# Patient Record
Sex: FEMALE | Race: WHITE
[De-identification: names, ages, dates, MRNs, and addresses within clinical notes are randomized per-mention and may not be internally consistent; named-entity substitution may affect disease eponyms.]

---

## 2019-03-25 ENCOUNTER — HOSPITAL ENCOUNTER (EMERGENCY)
Dept: HOSPITAL 25 - UCCORT | Age: 44
Discharge: HOME | End: 2019-03-25
Payer: COMMERCIAL

## 2019-03-25 VITALS — DIASTOLIC BLOOD PRESSURE: 66 MMHG | SYSTOLIC BLOOD PRESSURE: 103 MMHG

## 2019-03-25 DIAGNOSIS — F17.210: ICD-10-CM

## 2019-03-25 DIAGNOSIS — M54.40: Primary | ICD-10-CM

## 2019-03-25 PROCEDURE — G0463 HOSPITAL OUTPT CLINIC VISIT: HCPCS

## 2019-03-25 PROCEDURE — 99211 OFF/OP EST MAY X REQ PHY/QHP: CPT

## 2019-03-25 NOTE — ED
Back Pain





- HPI Summary


HPI Summary: 





43 yr old female with the complaint of low back pain.  Onset of pain on 3/21.  

She was doing a lot of cleaning and bending over.  She felt her muscles felt 

pulled, but then the follow ing morning she felt the back muscles feel tighter 

and she noticed it was harder to stand up straight.  She started herself on 

prednisone since she has a history of Lupus and gets flare ups.   She has had 

pain in the same area in the past with sciatica.   The patient rated her pain 

as 10/10 on friday.  Today it is 6/10 and she is feeling better.  She has no 

bowel or bladder incontinence.  She is already on Vicodin for chronic pain from 

her PMD.





No fever, chills, falls or trauma.  No other complaints.  





- History of Current Complaint


Chief Complaint: UCBackPain


Stated Complaint: SEVERE BACK PAIN


Time Seen by Provider: 03/25/19 18:24


Hx Last Menstrual Period: 3/10/19


Pain Intensity: 7





- Allergies/Home Medications


Allergies/Adverse Reactions: 


 Allergies











Allergy/AdvReac Type Severity Reaction Status Date / Time


 


No Known Allergies Allergy   Verified 03/25/19 18:26














PMH/Surg Hx/FS Hx/Imm Hx


Endocrine/Hematology History: 


   Denies: Hx Diabetes


Cardiovascular History: 


   Denies: Hx Hypertension, Hx Pacemaker/ICD


 History: 


   Denies: Hx Renal Disease


Sensory History: 


   Denies: Hx Hearing Aid


Neurological History: Reports: Other Neuro Impairments/Disorders - LUPUS


Psychiatric History: Reports: Hx Anxiety


   Denies: Hx Panic Disorder





- Cancer History


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


Hx Chemotherapy: No


Hx Radiation Therapy: No





- Surgical History


Surgery Procedure, Year, and Place: Essure, Appendectomy


Infectious Disease History: No


Infectious Disease History: 


   Denies: Traveled Outside the [place] in Last 30 Days





- Family History


Known Family History: Positive: Hypertension





- Social History


Alcohol Use: Rare


Substance Use Type: Reports: None


Smoking Status (MU): Heavy Every Day Tobacco Smoker


Type: Cigarettes


Amount Used/How Often: 1 PPD


Length of Time of Smoking/Using Tobacco: 22 Years


Have You Smoked in the Last Year: Yes





Review of Systems


Constitutional: Negative


Positive: Other - back pain


All Other Systems Reviewed And Are Negative: Yes





Physical Exam


Triage Information Reviewed: Yes


Vital Signs On Initial Exam: 


 Initial Vitals











Temp Pulse Resp BP Pulse Ox


 


 97.7 F   86   16   103/66   98 


 


 03/25/19 18:23  03/25/19 18:23  03/25/19 18:23  03/25/19 18:23  03/25/19 18:23











Vital Signs Reviewed: Yes


Appearance: Positive: Well-Appearing, No Pain Distress


Skin: Positive: Warm, Skin Color Reflects Adequate Perfusion


Head/Face: Positive: Normal Head/Face Inspection


Eyes: Positive: EOMI


ENT: Positive: Normal ENT inspection


Neck: Positive: Nontender


Respiratory/Lung Sounds: Positive: Clear to Auscultation, Breath Sounds Present


Cardiovascular: Positive: RRR


Abdomen Description: Negative: Distended


Musculoskeletal: Positive: Strength/ROM Intact, Other - No midline CTLS spine 

tenderness.  She has some paraspinal muscle tenderness in the lumbar area.  She 

has positive straight leg raise both right and left legs.


Neurological: Positive: Sensory/Motor Intact, Alert, Oriented to Person Place, 

Time, CN Intact II-III, Speech Normal





- Richard Coma Scale


Best Eye Response: 4 - Spontaneous


Best Motor Response: 6 - Obeys Commands


Best Verbal Response: 5 - Oriented


Coma Scale Total: 15





Diagnostics





- Vital Signs


 Vital Signs











  Temp Pulse Resp BP Pulse Ox


 


 03/25/19 18:23  97.7 F  86  16  103/66  98














- Laboratory


Lab Statement: Any lab studies that have been ordered have been reviewed, and 

results considered in the medical decision making process.





Back Pain Course/Dx





- Course


Course Of Treatment: 43 yr old with back strain and sciatic features.  She has 

had the same in the past.  She likely overdid it bending over and cleaning.  

The patient has steadily gotten better the past couple of days.  DC home.





- Diagnoses


Provider Diagnoses: 


 Back pain, Sciatica








Discharge





- Sign-Out/Discharge


Documenting (check all that apply): Patient Departure


All imaging exams completed and their final reports reviewed: No Studies





- Discharge Plan


Condition: Good


Disposition: HOME


Patient Education Materials:  Back Pain (ED)


Referrals: 


Adan Granados MD [Primary Care Provider] - 2 Days





- Billing Disposition and Condition


Condition: GOOD


Disposition: Home

## 2019-12-09 ENCOUNTER — HOSPITAL ENCOUNTER (EMERGENCY)
Dept: HOSPITAL 25 - UCCORT | Age: 44
Discharge: HOME | End: 2019-12-09
Payer: COMMERCIAL

## 2019-12-09 VITALS — DIASTOLIC BLOOD PRESSURE: 96 MMHG | SYSTOLIC BLOOD PRESSURE: 138 MMHG
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
## 2019-12-09 DIAGNOSIS — F17.210: ICD-10-CM
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
## 2019-12-09 DIAGNOSIS — M32.9: ICD-10-CM

## 2019-12-09 DIAGNOSIS — S89.91XA: Primary | ICD-10-CM

## 2019-12-09 PROCEDURE — G0463 HOSPITAL OUTPT CLINIC VISIT: HCPCS

## 2019-12-09 PROCEDURE — 99212 OFFICE O/P EST SF 10 MIN: CPT

## 2019-12-09 NOTE — UC
Knee Pain HPI





- HPI Summary


HPI Summary: 





44 year old female with PMH + for RA as a child, stated she was told in her 20'

s that she no longer had RA, but was recently diagnosed with lupus by her PCP.  

Started for past 2-3 weeks has had increased right knee pain, no trauma, no 

eliciting event.   + swelling, + warmth, no redness. nofever, chills,   no h/o 

tick bites, rashes.   was told in 20's she was a candidate for a knee 

replacement.   





- History of Current Complaint


Chief Complaint: UCLowerExtremity


Stated Complaint: R KNEE COMP


Time Seen by Provider: 12/09/19 10:38


Hx Obtained From: Patient


Hx Last Menstrual Period: 3/10/19


Pregnant?: No


Onset/Duration: Sudden Onset, Lasting Weeks - 2-3


Severity Initially: Severe


Severity Currently: Severe


Pain Intensity: 8


Pain Scale Used: 0-10 Numeric


Character: Sharp, Aching, Spasmodic, Stiffness


Aggravating Factor(s): Movement, Weight Bearing


Alleviating Factor(s): Rest





- Allergies/Home Medications


Allergies/Adverse Reactions: 


 Allergies











Allergy/AdvReac Type Severity Reaction Status Date / Time


 


No Known Allergies Allergy   Verified 12/09/19 11:07














PMH/Surg Hx/FS Hx/Imm Hx


Previously Healthy: Yes - lupus 





- Surgical History


Surgical History: Yes


Surgery Procedure, Year, and Place: Essure, Appendectomy





- Family History


Known Family History: Positive: Hypertension, Non-Contributory





- Social History


Occupation: Employed Full-time


Alcohol Use: Occasionally


Substance Use Type: None


Smoking Status (MU): Heavy Every Day Tobacco Smoker


Type: Cigarettes


Amount Used/How Often: 1 PPD


Length of Time of Smoking/Using Tobacco: 22 Years


Have You Smoked in the Last Year: Yes





- Immunization History


Most Recent Influenza Vaccination: Not the 2015/2016 Season





Review of Systems


All Other Systems Reviewed And Are Negative: Yes


Constitutional: Negative: Fever, Chills, Fatigue


Musculoskeletal: Positive: Arthralgia, Decreased ROM, Edema, Myalgia


Neurological: Positive: Negative


Is Patient Immunocompromised?: No





Physical Exam


Triage Information Reviewed: Yes


Appearance: Well-Appearing, Well-Nourished, Ill-Appearing - mild


Vital Signs: 


 Initial Vital Signs











Temp  98.2 F   12/09/19 11:05


 


Pulse  70   12/09/19 11:05


 


Resp  16   12/09/19 11:05


 


BP  138/96   12/09/19 11:05


 


Pulse Ox  100   12/09/19 11:05











Vital Signs Reviewed: Yes


Eyes: Positive: Conjunctiva Clear


ENT: Positive: Hearing grossly normal


Musculoskeletal: Positive: Strength Intact, ROM Limited @ - 15-90 PROM due to 

pain.  TTP throughout knee, worse MJL, LJL, lateral patella/ medial pattella.  

  mild JE.     no ankle pain.   neg homans SITLT.


Neurological: Positive: Alert, Muscle Tone Normal


Psychological Exam: Normal


Skin Exam: Normal


Skin: Positive: Other - no rashes, open wounds, sores.





Knee Pain Course/Dx





- Course


Course Of Treatment: 





- FOllow up with orthopedics for further work up 


- Naproxen twice daily to decrease swelling, pain 


- Ice, elevate as much as possible 


- Return with worsening of symptoms, fever, chills, rash. 


- Discussed the possibility of lyme disease 








- Differential Dx/Diagnosis


Provider Diagnosis: 


 Knee injury








Discharge ED





- Sign-Out/Discharge


Documenting (check all that apply): Patient Departure


All imaging exams completed and their final reports reviewed: Yes





- Discharge Plan


Condition: Good


Disposition: HOME


Prescriptions: 


Naproxen [Naproxen 500 mg tab] 500 mg PO BID #60 tablet.dr


Patient Education Materials:  Knee Pain (ED)


Referrals: 


Adan Granados MD [Primary Care Provider] - 


Jonh Jarrell MD [Medical Doctor] - 


(Follow up within 1 week 


)


Additional Instructions: 


- FOllow up with orthopedics for further work up 


- Naproxen twice daily to decrease swelling, pain 


- Ice, elevate as much as possible 


- Return with worsening of symptoms, fever, chills, rash. 


- Discussed the possibility of lyme disease 








- Billing Disposition and Condition


Condition: GOOD


Disposition: Home





- Attestation Statements


Provider Attestation: 





This patient was not seen by me.


I was available for consult.


Chart reviewed.





LYLE